# Patient Record
Sex: FEMALE
[De-identification: names, ages, dates, MRNs, and addresses within clinical notes are randomized per-mention and may not be internally consistent; named-entity substitution may affect disease eponyms.]

---

## 2023-09-02 ENCOUNTER — NURSE TRIAGE (OUTPATIENT)
Dept: OTHER | Facility: CLINIC | Age: 43
End: 2023-09-02

## 2023-09-02 NOTE — TELEPHONE ENCOUNTER
Received call from [facility] at [facility] Name: Benita Ramos MRN: [id number]    Current Symptoms: Pt had a hysterectomy 3 days ago. She has not had a BM since the surgery. She is still taking Percocet as needed for pain. Associated Symptoms: NA    What has been tried: Warm prune juice, Miralax    Triage indicates: Home care    Discussed medication options with on call provider, Dr. Modesta Rivers. He recommended that the pt take Dulcolax or Magnesium Citrate (1/2 bottle first, if no BM after 12 hours, take additional 1/2 bottle). Pt is not to use a suppository or enema. Care advice provided, patient verbalizes understanding; denies any other questions or concerns; instructed to call back for any new or worsening symptoms.     This triage is a result of a call to the [address]    Reason for Disposition   MILD constipation    Protocols used: Constipation-ADULT-